# Patient Record
Sex: FEMALE | Race: WHITE
[De-identification: names, ages, dates, MRNs, and addresses within clinical notes are randomized per-mention and may not be internally consistent; named-entity substitution may affect disease eponyms.]

---

## 2020-03-01 ENCOUNTER — HOSPITAL ENCOUNTER (EMERGENCY)
Dept: HOSPITAL 4 - SED | Age: 45
Discharge: ANOTHER HOSPITAL | End: 2020-03-01
Payer: MEDICAID

## 2020-03-01 VITALS — SYSTOLIC BLOOD PRESSURE: 198 MMHG | BODY MASS INDEX: 25.76 KG/M2 | WEIGHT: 140 LBS | HEIGHT: 62 IN

## 2020-03-01 VITALS — SYSTOLIC BLOOD PRESSURE: 126 MMHG

## 2020-03-01 DIAGNOSIS — I62.9: Primary | ICD-10-CM

## 2020-03-01 LAB
ALBUMIN SERPL BCP-MCNC: 3.3 G/DL (ref 3.4–4.8)
ALT SERPL W P-5'-P-CCNC: 21 U/L (ref 12–78)
AMPHETAMINES UR QL SCN: POSITIVE
ANION GAP SERPL CALCULATED.3IONS-SCNC: 7 MMOL/L (ref 5–15)
APAP SERPL-MCNC: < 1 UG/ML (ref 1–30)
AST SERPL W P-5'-P-CCNC: 25 U/L (ref 10–37)
BARBITURATES UR QL SCN: NEGATIVE
BASOPHILS # BLD AUTO: 0.2 K/UL (ref 0–0.2)
BASOPHILS NFR BLD AUTO: 1.3 % (ref 0–2)
BENZODIAZ UR QL SCN: NEGATIVE
BILIRUB SERPL-MCNC: 0.2 MG/DL (ref 0–1)
BUN SERPL-MCNC: 11 MG/DL (ref 8–21)
BZE UR QL SCN: NEGATIVE
CALCIUM SERPL-MCNC: 8.3 MG/DL (ref 8.4–11)
CANNABINOIDS UR QL SCN: NEGATIVE
CHLORIDE SERPL-SCNC: 102 MMOL/L (ref 98–107)
CK MB SERPL-CCNC: 3.9 NG/ML (ref 0–3.6)
CK MB SERPL-RTO: 0.9 % (ref 0–2.9)
CREAT SERPL-MCNC: 0.96 MG/DL (ref 0.55–1.3)
EOSINOPHIL # BLD AUTO: 0.2 K/UL (ref 0–0.4)
EOSINOPHIL NFR BLD AUTO: 1.7 % (ref 0–4)
ERYTHROCYTE [DISTWIDTH] IN BLOOD BY AUTOMATED COUNT: 18.8 % (ref 9–15)
ETHANOL SERPL-MCNC: < 3 MG/DL (ref ?–10)
GFR SERPL CREATININE-BSD FRML MDRD: 82 ML/MIN (ref 90–?)
GLUCOSE SERPL-MCNC: 143 MG/DL (ref 70–99)
HCT VFR BLD AUTO: 39.4 % (ref 36–48)
HGB BLD-MCNC: 12.3 G/DL (ref 12–16)
INR PPP: 1 (ref 0.8–1.2)
LYMPHOCYTES # BLD AUTO: 4.5 K/UL (ref 1–5.5)
LYMPHOCYTES NFR BLD AUTO: 32.9 % (ref 20.5–51.5)
MCH RBC QN AUTO: 24 PG (ref 27–31)
MCHC RBC AUTO-ENTMCNC: 31 % (ref 32–36)
MCV RBC AUTO: 77 FL (ref 79–98)
METHADONE UR-SCNC: NEGATIVE UMOL/L
METHAMPHET UR-SCNC: NEGATIVE UMOL/L
MONOCYTES # BLD MANUAL: 1 K/UL (ref 0–1)
MONOCYTES # BLD MANUAL: 7.1 % (ref 1.7–9.3)
NEUTROPHILS # BLD AUTO: 7.7 K/UL (ref 1.8–7.7)
NEUTROPHILS NFR BLD AUTO: 57 % (ref 40–70)
OPIATES UR QL SCN: NEGATIVE
OXYCODONE SERPL-MCNC: NEGATIVE NG/ML
PCP UR QL SCN: NEGATIVE
PLATELET # BLD AUTO: 494 K/UL (ref 130–430)
POTASSIUM SERPL-SCNC: 2.9 MMOL/L (ref 3.5–5.1)
PROTHROMBIN TIME: 10 SECS (ref 9.5–12.5)
RBC # BLD AUTO: 5.16 MIL/UL (ref 4.2–6.2)
SODIUM SERPLBLD-SCNC: 138 MMOL/L (ref 136–145)
TRICYCLICS UR-MCNC: NEGATIVE NG/ML
URINE PROPOXYPHENE SCREEN: NEGATIVE
WBC # BLD AUTO: 13.6 K/UL (ref 4.8–10.8)

## 2020-03-01 PROCEDURE — 85025 COMPLETE CBC W/AUTO DIFF WBC: CPT

## 2020-03-01 PROCEDURE — 71045 X-RAY EXAM CHEST 1 VIEW: CPT

## 2020-03-01 PROCEDURE — 81025 URINE PREGNANCY TEST: CPT

## 2020-03-01 PROCEDURE — 85610 PROTHROMBIN TIME: CPT

## 2020-03-01 PROCEDURE — 82140 ASSAY OF AMMONIA: CPT

## 2020-03-01 PROCEDURE — 31500 INSERT EMERGENCY AIRWAY: CPT

## 2020-03-01 PROCEDURE — 85730 THROMBOPLASTIN TIME PARTIAL: CPT

## 2020-03-01 PROCEDURE — 70450 CT HEAD/BRAIN W/O DYE: CPT

## 2020-03-01 PROCEDURE — 84703 CHORIONIC GONADOTROPIN ASSAY: CPT

## 2020-03-01 PROCEDURE — 36600 WITHDRAWAL OF ARTERIAL BLOOD: CPT

## 2020-03-01 PROCEDURE — 96375 TX/PRO/DX INJ NEW DRUG ADDON: CPT

## 2020-03-01 PROCEDURE — 80053 COMPREHEN METABOLIC PANEL: CPT

## 2020-03-01 PROCEDURE — G0482 DRUG TEST DEF 15-21 CLASSES: HCPCS

## 2020-03-01 PROCEDURE — 82550 ASSAY OF CK (CPK): CPT

## 2020-03-01 PROCEDURE — 83605 ASSAY OF LACTIC ACID: CPT

## 2020-03-01 PROCEDURE — 83880 ASSAY OF NATRIURETIC PEPTIDE: CPT

## 2020-03-01 PROCEDURE — 36415 COLL VENOUS BLD VENIPUNCTURE: CPT

## 2020-03-01 PROCEDURE — 84484 ASSAY OF TROPONIN QUANT: CPT

## 2020-03-01 PROCEDURE — 93005 ELECTROCARDIOGRAM TRACING: CPT

## 2020-03-01 PROCEDURE — 96365 THER/PROPH/DIAG IV INF INIT: CPT

## 2020-03-01 PROCEDURE — 80307 DRUG TEST PRSMV CHEM ANLYZR: CPT

## 2020-03-01 PROCEDURE — G0481 DRUG TEST DEF 8-14 CLASSES: HCPCS

## 2020-03-01 PROCEDURE — G0480 DRUG TEST DEF 1-7 CLASSES: HCPCS

## 2020-03-01 PROCEDURE — 99291 CRITICAL CARE FIRST HOUR: CPT

## 2020-03-01 PROCEDURE — 82803 BLOOD GASES ANY COMBINATION: CPT

## 2020-03-01 PROCEDURE — 82962 GLUCOSE BLOOD TEST: CPT

## 2020-03-01 PROCEDURE — 82553 CREATINE MB FRACTION: CPT

## 2020-03-01 NOTE — NUR
Vent 



Vent settings AC 18, 500, 100% FiO2. Pt tolerating vent change with oxygen 
saturation of 98% connected to in room monitor.

## 2020-03-01 NOTE — NUR
# 16 FR [place] catheter with use of sterile technique. Immediate return of 30 cc 
cloudy light yellow urine noted.  Bedside drainage bag placed below level of 
bladder.  Urine sample collected and sent to lab.  Pt tolerated procedure well.



Patient unable to toilet self d/t condition

## 2020-03-01 NOTE — NUR
Patient arrived via ALS ambulance, [facility]. Patient arrived 
hypertensive and bradycardic. Per EMS they were called related to ALOC. LKWT 
was @ 1000. She went to take a nap with her boyfriend, and she told him to 
check if she was breathing when he woke up. Patient has history of cocaine use. 
Unknown if she could have overdosed. Patients pupils pinpoint, given narcan en 
route. Patient GCS 5. Arousable to painful stimuli. Patient has IV established 
by EMS 18ga to left AC with 500cc bolus of NS.

## 2020-03-01 NOTE — NUR
Report 



Report given to Pj Lovett RN critical care transport nurse at bedside. Pt's 
approx last know well time is 1200 per pt's boyfriend and pt's mother.

## 2020-03-01 NOTE — NUR
Pt positive with bleed. Dr. Howard spoke with Dr. Mac, will transfer to [facility]

## 2020-03-01 NOTE — NUR
Time out performed for intubation. Dr. Howard at bedside with RT and ACLS RN x2. 
Pt unresponsive, pupils fixed. Rt eye veer to right. Diaphoretic

## 2020-03-01 NOTE — NUR
Patient to be transferred to [facility].  Is being transferred due to higher 
level of care.  Receiving facility has accepting physician and available space. 
ER physician has signed transfer form.  Patient or responsible party has agreed 
to transfer and signed form.  Patient belongings inventoried and will be sent 
with patient.  Copy of nursing notes, lab reports, EKG, Physicians Orders and 
X-rays to be sent with patient.  Report called to  at receiving facility. 
Receiving physician is Estefania. [facility] ambulance service has been called for 
transfer.  Arrived for transfer.

## 2020-03-01 NOTE — NUR
Off Unit



Pt left ED with critical care transport team enroute to [facility], pts mother who 
was at bedside is informed and all questions answered at this time.

## 2020-03-01 NOTE — NUR
Patient not known to be of DNR status.  Patient medicated with vecuronium 10mg 
of for sedation prior to placement of ET tube.  Respiratory therapy at bedside 
prior to placement.  Size 7.5 ET tube placed by Dr. Howard.  Cuff inflated with cc 
air.  Auscultation of breath sounds over bilateral chest wall. ET tube secured 
with device.  O2 sats 100%  pulse ox.  PCXR ordered to check tube placement.  

-------------------------------------------------------------------------------

Addendum: 03/01/20 at 1548 by [facility]

-------------------------------------------------------------------------------

lidocaine 2% given prior to intubation

## 2020-03-01 NOTE — NUR
Upon further assessment patient found to have right sided gaze to right eye. 
Pupils are fixed and non reactive. Charge nurse and MD informed. CT brain is 
ordered. Patient placed on portable cardiac monitor and RN, RT and tech went to 
CT with patient.